# Patient Record
Sex: MALE | Race: BLACK OR AFRICAN AMERICAN | Employment: FULL TIME | URBAN - METROPOLITAN AREA
[De-identification: names, ages, dates, MRNs, and addresses within clinical notes are randomized per-mention and may not be internally consistent; named-entity substitution may affect disease eponyms.]

---

## 2018-11-06 ENCOUNTER — ANESTHESIA EVENT (OUTPATIENT)
Dept: SURGERY | Age: 38
End: 2018-11-06
Payer: COMMERCIAL

## 2018-11-06 NOTE — H&P
[address] 10/30/2018 1:43 PM 
Location: The [address] [place] [address] Patient #: [id number] : 1980 Single / Language: [place] / Race: Black or  Male History of Present Illness Lashay Avila; 10/30/2018 2:02 PM) The patient is a 44year old male who presents for a recheck of Hand Problem. The problem is described as being located in the right hand. The onset of the hand problem has been sudden following a specific incident (punched a wall with license in his hand. ). The symptom has been occuring for 17 days (2018). The symptom occurs intermittently. The course has been improving (can't lift small finger. ). The hand problem is described as mild. The hand problem is aggravated by flexion of the fingers and extention of the fingers. The hand problem is relieved by brace. The symptoms have been associated with painful ROM, decreased ROM and swelling. Previous evaluations done by urgent care center. Previous diagnostic tests have included X-Ray. Patient previous treatment has included  brace. Last office visit was Date: (2018). The treatment performed last visit was other (splint). The patient's dominant hand is their right. Problem List/Past Medical Lashay Avila; 10/30/2018 2:02 PM) No pertinent past medical history   
 
Allergies Lashay Avila; 10/30/2018 2:02 PM) No Known Drug Allergies  [10/16/2018]: 
 
Family History Lashay Avila; 10/30/2018 2:02 PM) Family history unknown   
 
Social History Lashay Avila; 10/30/2018 2:02 PM) Tobacco Use   Never smoker. No Drug Use   
Alcohol Use   Drinks beer. 6 per week Medication History Lashay Avila; 10/30/2018 2:02 PM) No Current Medications  
Medications Reconciled  
 
Past Surgical History Lashay Avila; 10/30/2018 2:02 PM) Umbilical Hernia Repair   
 
Other Problems Lashay Avila; 10/30/2018 2:02 PM) Closed fracture of fifth metacarpal bone (815.00  S62.308A)   
 Closed displaced fracture of base of fourth metacarpal bone of right hand, initial encounter (751.10  T61.683V)   
 
 
 
Review of Systems Geena Alvarado MD; 10/30/2018 4:18 PM) General Not Present- Chills and Fever. Skin Not Present- Bruising, Pallor and Skin Color Changes. Respiratory Not Present- Cough and Difficulty Breathing. Cardiovascular Not Present- Chest Pain and Fainting / Blacking Out. Musculoskeletal Present- Decreased Range of Motion and Joint Pain. Neurological Not Present- Dysesthesia, Paresthesias and Weakness In Extremities. Hematology Not Present- Abnormal Bleeding and Petechiae. Physical Exam (Maribel Alvarado MD; 10/30/2018 4:21 PM) General 
Mental Status - Alert. General Appearance - Cooperative, Well groomed, Not in acute distress. Orientation - Oriented X3. Build & Nutrition - Well nourished. Musculoskeletal 
Upper Extremity Hand/Wrist: Hand - Evaluation of related systems reveals - no digital clubbing or cyanosis and neurovascularly intact bilaterally. Examination of the right hand reveals: Note: range of motion is diminished but present at the fingers of the RIGHT hand. There's no evidence of scissoring or rotational deformity. Inspection and Palpation - Tenderness - moderate, localized and over the dorsal aspect, (R) (especially over the ring finger CMC region and small finger metacarpal neck region). Swelling - fluid, (R). Crepitus - no crepitus bilateral. Sensation is - normal, (R). Examination reveals - ecchymosis present, (R) (especially at the ulnar palm). There is a documented fracture: Location - Right - 4th metacarpal (metacarpal base-with comminution and mild displacement( approximately 20° apex dorsal angulation)) and 5th metacarpal (metacarpal neck fracture with approximately 25-30° apex dorsal angulation and slight impaction). Phalanges: 
Right: Thumb - Functional Testing - Flexor Pollicis Longus is intact. Index Finger - Functional Testing - Flexor Digitorum Superficialis is intact and Flexor Digitorum Profundus is intact. Long Finger - Functional Testing - Flexor Digitorum Superficialis is intact and Flexor Digitorum Profundus is intact. Ring Finger: Inspection and Palpation - Tenderness - 3+ and generalized (along the metacarpal. Nontender at the PIP joint). Swelling - fluid. Ring Finger - Deformities/Malalignments/Discrepancies - Note: there is appearance of ulnar angulation at the PIP. Functional Testing - Flexor Digitorum Superficialis is intact and Flexor Digitorum Profundus is intact, Ligamentous Instability Test negative (no instability or tenderness at the PIP with vigorous testing). Small Finger: Inspection and Palpation - Tenderness - moderate. Swelling - fluid. ROJM: 
MCP: 
Extension: AROM: Note: extensor lag of approximately 20°. PIP: 
Extension: Extension - AROM - Note: extensor lag of approximately 30 degrees. Small Finger - Functional Testing - Flexor Digitorum Superficialis is intact and Flexor Digitorum Profundus is intact. Xrays: three-view x-rays of the RIGHT hand were obtained (CPT 46078), reviewed and compared to prior imaging. Ring finger boxer's fracture is again demonstrated and apex dorsal angulation has increased top approximately 45-50 degrees. There is a slight volar shift of the proximal phalanx access with respect to the metacarpal axis. Ring finger CMC fracture is against demonstrated with mild displacement and mild collapsing of the shaft into the fracture fragments. Assessment & Plan (Danilo Doherty. Artie Reid MD; 10/30/2018 4:27 PM) Closed fracture of fifth metacarpal bone (815.00  S62.308A) Impression: boxer's fracture RIGHT small finger with apex dorsal angulation increasing to approximately 45-50 degrees on imaging. Small finger he now has an extensor lag which is more pronounced.  We reviewed x-ray imaging obtained today and discussed her the treatment options including nonoperative intervention versus surgical correction. We discussed the nature of surgical correction including closed reduction possible open reduction internal fixation. It can patient would like to pursue surgical intervention, because \"my hand Work like this\". Darlene Shane the procedure was discussed including anticipated perioperative course as well as potential location of incisions. We discussed residual extensor lag, stiffness and risks and possible complications including but not limited to bleeding infection, injury to surrounding structures, malunion and nonunion which were described in plain language. I also discussed the influence of this past trauma and fractures to the ability to manipulate and potential residual functional deficits, including extensor lag, of the finger. Patient understands the discussion was proceed. All questions answered. Will arrange for surgery the next of opportunity. Current Plans Pt Education - General Patient Education: discussed with patient and provided information. List of current medications documented by the Provider () Preferred Dates for Surgery:  As soon as possible Results were reviewed with patient The procedure was discussed with the patient and a written consent was obtained and questions were answered.  Risks of the procedure were discussed and include but are not limited to infection, bleeding, nerve, vascular injury as well as the need for future procedures.  It was also discussed the importance of compliance with the treatment directions and participation in the care of the treated extremity. Patient is to avoid activities that cause pain and is instructed to leave the splint and/or cast in place. Closed displaced fracture of base of fourth metacarpal bone of right hand with malunion, subsequent encounter (203.04 G51.764A) Impression: RIGHT ring finger metacarpal base fracture with mild displacement. I explained with the small finger extensor lag and malunion developing, surgical repair of the ring finger would also be beneficial. This is because with stabilization of the ring finger metacarpal, more aggressive therapy can occur. Gladys Weller the procedure was discussed. We will also perform close reduction pinning ring finger metacarpal base fractures in conjunction with repairs of RIGHT small finger fractures. All questions answered Current Plans The procedure was discussed with the patient and a written consent was obtained and questions were answered.  Risks of the procedure were discussed and include but are not limited to infection, bleeding, nerve, vascular injury as well as the need for future procedures.  It was also discussed the importance of compliance with the treatment directions and participation in the care of the treated extremity. Note: _____________________________________ This note was created using voice recognition and transcription software.  Although proofread, it may have human proofreading, computer transcription and phonetic errors. Corrections may be performed at a later date. Please contact my office for any clarification needed. Signed electronically by Savana Miranda MD (10/30/2018 4:27 PM)

## 2018-11-07 ENCOUNTER — HOSPITAL ENCOUNTER (OUTPATIENT)
Age: 38
Setting detail: OUTPATIENT SURGERY
Discharge: HOME | End: 2018-11-07
Attending: PLASTIC SURGERY | Admitting: PLASTIC SURGERY
Payer: COMMERCIAL

## 2018-11-07 ENCOUNTER — ANESTHESIA (OUTPATIENT)
Dept: SURGERY | Age: 38
End: 2018-11-07
Payer: COMMERCIAL

## 2018-11-07 VITALS
RESPIRATION RATE: 13 BRPM | WEIGHT: 177.56 LBS | TEMPERATURE: 97.2 F | HEIGHT: 66 IN | BODY MASS INDEX: 28.54 KG/M2 | SYSTOLIC BLOOD PRESSURE: 132 MMHG | OXYGEN SATURATION: 98 % | DIASTOLIC BLOOD PRESSURE: 86 MMHG | HEART RATE: 59 BPM

## 2018-11-07 DIAGNOSIS — S62.339P CLOSED BOXER'S FRACTURE WITH MALUNION, SUBSEQUENT ENCOUNTER: Primary | ICD-10-CM

## 2018-11-07 DIAGNOSIS — S62.314P: ICD-10-CM

## 2018-11-07 PROCEDURE — 76210000021 HC REC RM PH II 0.5 TO 1 HR: Performed by: PLASTIC SURGERY

## 2018-11-07 PROCEDURE — 74011250636 HC RX REV CODE- 250/636: Performed by: NURSE ANESTHETIST, CERTIFIED REGISTERED

## 2018-11-07 PROCEDURE — 74011250636 HC RX REV CODE- 250/636

## 2018-11-07 PROCEDURE — 76060000035 HC ANESTHESIA 2 TO 2.5 HR: Performed by: PLASTIC SURGERY

## 2018-11-07 PROCEDURE — 77030002986 HC SUT PROL J&J -A: Performed by: PLASTIC SURGERY

## 2018-11-07 PROCEDURE — 74011000250 HC RX REV CODE- 250: Performed by: PLASTIC SURGERY

## 2018-11-07 PROCEDURE — 74011250637 HC RX REV CODE- 250/637: Performed by: NURSE ANESTHETIST, CERTIFIED REGISTERED

## 2018-11-07 PROCEDURE — 76010000131 HC OR TIME 2 TO 2.5 HR: Performed by: PLASTIC SURGERY

## 2018-11-07 PROCEDURE — 77030011267 HC ELECTRD BLD COVD -A: Performed by: PLASTIC SURGERY

## 2018-11-07 PROCEDURE — 77030008833 HC WRE K BRSM -A: Performed by: PLASTIC SURGERY

## 2018-11-07 PROCEDURE — A4565 SLINGS: HCPCS | Performed by: PLASTIC SURGERY

## 2018-11-07 PROCEDURE — 77030033827 HC SLV COMPR SCD THGH COVD -B: Performed by: PLASTIC SURGERY

## 2018-11-07 PROCEDURE — 74011250636 HC RX REV CODE- 250/636: Performed by: PLASTIC SURGERY

## 2018-11-07 PROCEDURE — 76210000063 HC OR PH I REC FIRST 0.5 HR: Performed by: PLASTIC SURGERY

## 2018-11-07 PROCEDURE — 77030018836 HC SOL IRR NACL ICUM -A: Performed by: PLASTIC SURGERY

## 2018-11-07 PROCEDURE — 77030031139 HC SUT VCRL2 J&J -A: Performed by: PLASTIC SURGERY

## 2018-11-07 PROCEDURE — 77030020753 HC CUF TRNQT 1BLA STRY -B: Performed by: PLASTIC SURGERY

## 2018-11-07 PROCEDURE — 77030020335 HC PDNG CST COVD -A: Performed by: PLASTIC SURGERY

## 2018-11-07 PROCEDURE — 64450 NJX AA&/STRD OTHER PN/BRANCH: CPT | Performed by: ANESTHESIOLOGY

## 2018-11-07 PROCEDURE — 76942 ECHO GUIDE FOR BIOPSY: CPT | Performed by: ANESTHESIOLOGY

## 2018-11-07 PROCEDURE — 77030012510 HC MSK AIRWY LMA TELE -B: Performed by: ANESTHESIOLOGY

## 2018-11-07 RX ORDER — MAGNESIUM SULFATE 100 %
4 CRYSTALS MISCELLANEOUS AS NEEDED
Status: DISCONTINUED | OUTPATIENT
Start: 2018-11-07 | End: 2018-11-07 | Stop reason: HOSPADM

## 2018-11-07 RX ORDER — PROPOFOL 10 MG/ML
INJECTION, EMULSION INTRAVENOUS
Status: DISCONTINUED | OUTPATIENT
Start: 2018-11-07 | End: 2018-11-07 | Stop reason: HOSPADM

## 2018-11-07 RX ORDER — MORPHINE SULFATE 4 MG/ML
2-4 INJECTION INTRAVENOUS
Status: DISCONTINUED | OUTPATIENT
Start: 2018-11-07 | End: 2018-11-07 | Stop reason: HOSPADM

## 2018-11-07 RX ORDER — CEFAZOLIN SODIUM 2 G/50ML
2 SOLUTION INTRAVENOUS
Status: COMPLETED | OUTPATIENT
Start: 2018-11-07 | End: 2018-11-07

## 2018-11-07 RX ORDER — FENTANYL CITRATE 50 UG/ML
100 INJECTION, SOLUTION INTRAMUSCULAR; INTRAVENOUS ONCE
Status: DISCONTINUED | OUTPATIENT
Start: 2018-11-07 | End: 2018-11-07 | Stop reason: HOSPADM

## 2018-11-07 RX ORDER — SODIUM CHLORIDE, SODIUM LACTATE, POTASSIUM CHLORIDE, CALCIUM CHLORIDE 600; 310; 30; 20 MG/100ML; MG/100ML; MG/100ML; MG/100ML
25 INJECTION, SOLUTION INTRAVENOUS CONTINUOUS
Status: DISCONTINUED | OUTPATIENT
Start: 2018-11-07 | End: 2018-11-07 | Stop reason: HOSPADM

## 2018-11-07 RX ORDER — OXYCODONE AND ACETAMINOPHEN 5; 325 MG/1; MG/1
1-2 TABLET ORAL
Status: DISCONTINUED | OUTPATIENT
Start: 2018-11-07 | End: 2018-11-07 | Stop reason: HOSPADM

## 2018-11-07 RX ORDER — SODIUM CHLORIDE 0.9 % (FLUSH) 0.9 %
5-10 SYRINGE (ML) INJECTION AS NEEDED
Status: DISCONTINUED | OUTPATIENT
Start: 2018-11-07 | End: 2018-11-07 | Stop reason: HOSPADM

## 2018-11-07 RX ORDER — ONDANSETRON 2 MG/ML
INJECTION INTRAMUSCULAR; INTRAVENOUS AS NEEDED
Status: DISCONTINUED | OUTPATIENT
Start: 2018-11-07 | End: 2018-11-07 | Stop reason: HOSPADM

## 2018-11-07 RX ORDER — MIDAZOLAM HYDROCHLORIDE 1 MG/ML
INJECTION, SOLUTION INTRAMUSCULAR; INTRAVENOUS AS NEEDED
Status: DISCONTINUED | OUTPATIENT
Start: 2018-11-07 | End: 2018-11-07 | Stop reason: HOSPADM

## 2018-11-07 RX ORDER — HYDROCODONE BITARTRATE AND ACETAMINOPHEN 5; 325 MG/1; MG/1
1 TABLET ORAL
Qty: 10 TAB | Refills: 0 | Status: SHIPPED | OUTPATIENT
Start: 2018-11-07

## 2018-11-07 RX ORDER — MIDAZOLAM HYDROCHLORIDE 1 MG/ML
2 INJECTION, SOLUTION INTRAMUSCULAR; INTRAVENOUS ONCE
Status: DISCONTINUED | OUTPATIENT
Start: 2018-11-07 | End: 2018-11-07 | Stop reason: HOSPADM

## 2018-11-07 RX ORDER — MIDAZOLAM HYDROCHLORIDE 1 MG/ML
INJECTION, SOLUTION INTRAMUSCULAR; INTRAVENOUS
Status: COMPLETED
Start: 2018-11-07 | End: 2018-11-07

## 2018-11-07 RX ORDER — LIDOCAINE HYDROCHLORIDE 20 MG/ML
INJECTION, SOLUTION EPIDURAL; INFILTRATION; INTRACAUDAL; PERINEURAL AS NEEDED
Status: DISCONTINUED | OUTPATIENT
Start: 2018-11-07 | End: 2018-11-07 | Stop reason: HOSPADM

## 2018-11-07 RX ORDER — FENTANYL CITRATE 50 UG/ML
50 INJECTION, SOLUTION INTRAMUSCULAR; INTRAVENOUS
Status: DISCONTINUED | OUTPATIENT
Start: 2018-11-07 | End: 2018-11-07 | Stop reason: HOSPADM

## 2018-11-07 RX ORDER — FENTANYL CITRATE 50 UG/ML
INJECTION, SOLUTION INTRAMUSCULAR; INTRAVENOUS
Status: COMPLETED
Start: 2018-11-07 | End: 2018-11-07

## 2018-11-07 RX ORDER — SODIUM CHLORIDE 0.9 % (FLUSH) 0.9 %
5-10 SYRINGE (ML) INJECTION EVERY 8 HOURS
Status: DISCONTINUED | OUTPATIENT
Start: 2018-11-07 | End: 2018-11-07 | Stop reason: HOSPADM

## 2018-11-07 RX ORDER — IBUPROFEN 600 MG/1
TABLET ORAL
Qty: 20 TAB | Refills: 0 | Status: SHIPPED | OUTPATIENT
Start: 2018-11-07

## 2018-11-07 RX ORDER — SODIUM CHLORIDE, SODIUM LACTATE, POTASSIUM CHLORIDE, CALCIUM CHLORIDE 600; 310; 30; 20 MG/100ML; MG/100ML; MG/100ML; MG/100ML
50 INJECTION, SOLUTION INTRAVENOUS CONTINUOUS
Status: DISCONTINUED | OUTPATIENT
Start: 2018-11-07 | End: 2018-11-07 | Stop reason: HOSPADM

## 2018-11-07 RX ORDER — PROPOFOL 10 MG/ML
INJECTION, EMULSION INTRAVENOUS AS NEEDED
Status: DISCONTINUED | OUTPATIENT
Start: 2018-11-07 | End: 2018-11-07 | Stop reason: HOSPADM

## 2018-11-07 RX ORDER — FAMOTIDINE 20 MG/1
20 TABLET, FILM COATED ORAL ONCE
Status: COMPLETED | OUTPATIENT
Start: 2018-11-07 | End: 2018-11-07

## 2018-11-07 RX ORDER — INSULIN LISPRO 100 [IU]/ML
INJECTION, SOLUTION INTRAVENOUS; SUBCUTANEOUS ONCE
Status: DISCONTINUED | OUTPATIENT
Start: 2018-11-07 | End: 2018-11-07 | Stop reason: HOSPADM

## 2018-11-07 RX ORDER — LIDOCAINE HYDROCHLORIDE 10 MG/ML
INJECTION, SOLUTION EPIDURAL; INFILTRATION; INTRACAUDAL; PERINEURAL AS NEEDED
Status: DISCONTINUED | OUTPATIENT
Start: 2018-11-07 | End: 2018-11-07 | Stop reason: HOSPADM

## 2018-11-07 RX ORDER — ROPIVACAINE HYDROCHLORIDE 5 MG/ML
INJECTION, SOLUTION EPIDURAL; INFILTRATION; PERINEURAL
Status: COMPLETED | OUTPATIENT
Start: 2018-11-07 | End: 2018-11-07

## 2018-11-07 RX ORDER — CEFAZOLIN SODIUM 2 G/50ML
2 SOLUTION INTRAVENOUS ONCE
Status: DISCONTINUED | OUTPATIENT
Start: 2018-11-07 | End: 2018-11-07

## 2018-11-07 RX ORDER — ONDANSETRON 2 MG/ML
4 INJECTION INTRAMUSCULAR; INTRAVENOUS
Status: DISCONTINUED | OUTPATIENT
Start: 2018-11-07 | End: 2018-11-07 | Stop reason: HOSPADM

## 2018-11-07 RX ORDER — BUPIVACAINE HYDROCHLORIDE 5 MG/ML
INJECTION, SOLUTION EPIDURAL; INTRACAUDAL AS NEEDED
Status: DISCONTINUED | OUTPATIENT
Start: 2018-11-07 | End: 2018-11-07 | Stop reason: HOSPADM

## 2018-11-07 RX ORDER — DEXTROSE MONOHYDRATE 25 G/50ML
25-50 INJECTION, SOLUTION INTRAVENOUS AS NEEDED
Status: DISCONTINUED | OUTPATIENT
Start: 2018-11-07 | End: 2018-11-07 | Stop reason: HOSPADM

## 2018-11-07 RX ADMIN — CEFAZOLIN SODIUM 2 G: 2 SOLUTION INTRAVENOUS at 11:00

## 2018-11-07 RX ADMIN — ONDANSETRON 4 MG: 2 INJECTION INTRAMUSCULAR; INTRAVENOUS at 11:38

## 2018-11-07 RX ADMIN — PROPOFOL 30 MG: 10 INJECTION, EMULSION INTRAVENOUS at 11:12

## 2018-11-07 RX ADMIN — MIDAZOLAM HYDROCHLORIDE 2 MG: 1 INJECTION, SOLUTION INTRAMUSCULAR; INTRAVENOUS at 10:51

## 2018-11-07 RX ADMIN — MIDAZOLAM 2 MG: 1 INJECTION INTRAMUSCULAR; INTRAVENOUS at 09:37

## 2018-11-07 RX ADMIN — SODIUM CHLORIDE, SODIUM LACTATE, POTASSIUM CHLORIDE, AND CALCIUM CHLORIDE 50 ML/HR: 600; 310; 30; 20 INJECTION, SOLUTION INTRAVENOUS at 08:49

## 2018-11-07 RX ADMIN — FAMOTIDINE 20 MG: 20 TABLET ORAL at 08:49

## 2018-11-07 RX ADMIN — PROPOFOL 20 MG: 10 INJECTION, EMULSION INTRAVENOUS at 11:01

## 2018-11-07 RX ADMIN — FENTANYL CITRATE 100 MCG: 50 INJECTION, SOLUTION INTRAMUSCULAR; INTRAVENOUS at 09:37

## 2018-11-07 RX ADMIN — PROPOFOL 20 MG: 10 INJECTION, EMULSION INTRAVENOUS at 11:00

## 2018-11-07 RX ADMIN — PROPOFOL 75 MCG/KG/MIN: 10 INJECTION, EMULSION INTRAVENOUS at 11:05

## 2018-11-07 RX ADMIN — ROPIVACAINE HYDROCHLORIDE 25 ML: 5 INJECTION, SOLUTION EPIDURAL; INFILTRATION; PERINEURAL at 09:45

## 2018-11-07 RX ADMIN — PROPOFOL 60 MG: 10 INJECTION, EMULSION INTRAVENOUS at 10:59

## 2018-11-07 RX ADMIN — PROPOFOL 170 MG: 10 INJECTION, EMULSION INTRAVENOUS at 11:21

## 2018-11-07 RX ADMIN — LIDOCAINE HYDROCHLORIDE 50 MG: 20 INJECTION, SOLUTION EPIDURAL; INFILTRATION; INTRACAUDAL; PERINEURAL at 10:58

## 2018-11-07 NOTE — DISCHARGE INSTRUCTIONS
Open Reduction With Internal Fixation of a Limb: What to Expect at [address] can expect some pain and swelling around the cut (incision) the doctor made. This should get better within a few days after your surgery. But it is normal to have some pain for 2 to 3 weeks after surgery and mild pain for up to 6 weeks after surgery. How soon you can return to work and your normal routine depends on your job and how long it takes the bone to heal. For example, if you have a fractured leg and you sit at work, you may be able to go back in 1 to 2 weeks. But if your job requires you to walk or stand a lot, you will need to wait until your fracture has healed before you go back to work. This care sheet gives you a general idea about how long it will take for you to recover. But each person recovers at a different pace. Follow the steps below to get better as quickly as possible. How can you care for yourself at home? Activity    · Rest when you feel tired. Getting enough sleep will help you recover.     · Increase your activity as recommended by your doctor. Being active boosts blood flow and helps prevent pneumonia and constipation. It is usually okay to exercise other parts of your body as soon as you feel well enough.     · Avoid putting weight on your repaired bone until your doctor says it is okay.     · You will probably need to take 1 to 2 weeks off from work. It depends on the type of work you do and how you feel.     · Do not shower for 1 or 2 days after surgery. When you shower, keep your dressing and incisions dry. If you have a cast, tape a sheet of plastic to cover it so that it does not get wet. It may help to sit on a shower stool.     · Do not take a bath, swim, use a hot tub, or soak your affected limb until your incision is healed. This usually takes 1 to 2 weeks. Diet    · You can eat your normal diet.  If your stomach is upset, try bland, low-fat foods like plain rice, broiled chicken, toast, and yogurt. Medicines    · Your doctor will tell you if and when you can restart your medicines. He or she will also give you instructions about taking any new medicines.     · If you take blood thinners, such as warfarin (Coumadin), clopidogrel (Plavix), or aspirin, be sure to talk to your doctor. He or she will tell you if and when to start taking those medicines again. Make sure that you understand exactly what your doctor wants you to do.     · Take pain medicines exactly as directed. ? If the doctor gave you a prescription medicine for pain, take it as prescribed. ? If you are not taking a prescription pain medicine, ask your doctor if you can take an over-the-counter medicine.     · If you think your pain medicine is making you sick to your stomach:  ? Take your medicine after meals (unless your doctor has told you not to). ? Ask your doctor for a different pain medicine.     · If your doctor prescribed antibiotics, take them as directed. Do not stop taking them just because you feel better. You need to take the full course of antibiotics. Incision care    · If you have strips of tape on the incision, leave the tape on for a week or until it falls off.     · If you do not have a cast, clean the incision 2 times a day after your doctor allows you to remove the bandage. Use only soap and water to clean the incision unless your doctor gives you different instructions. Don't use hydrogen peroxide or alcohol, which can slow healing. Exercise    · Do exercises as instructed by your doctor or physical therapist. These exercises will help keep your muscles strong and your joints flexible while your bone is healing.     · Wiggle your fingers or toes on the injured arm or leg often. This helps reduce swelling and stiffness. Ice and elevation    · Prop up the injured arm or leg on a pillow when you ice it or anytime you sit or lie down during the first 1 to 2 weeks after your surgery.  Try to keep it above the level of your heart. This will help reduce swelling and pain. Other instructions    · If you have a cast or splint:  ? Keep it dry. ? If you have a removable splint, ask your doctor if it is okay to take it off to bathe. Your doctor may want you to keep it on as much as possible. Be careful not to put the splint on too tight. ? Do not stick objects such as pencils or coat hangers in your cast or splint to scratch your skin. ? Do not put powder into your cast or splint to relieve itchy skin. ? Never cut or alter your cast or splint. Follow-up care is a key part of your treatment and safety. Be sure to make and go to all appointments, and call your doctor if you are having problems. It's also a good idea to know your test results and keep a list of the medicines you take. When should you call for help? Call 911 anytime you think you may need emergency care. For example, call if:    · You passed out (lost consciousness).     · You have severe trouble breathing.     · You have sudden chest pain and shortness of breath, or you cough up blood.    Call your doctor now or seek immediate medical care if:    · You have pain that does not get better after you take pain medicine.     · Your fingers or toes on the injured arm or leg are cool, pale, or change color.     · You have tingling or numbness in your fingers or toes.     · You cannot move your fingers or toes.     · Your cast or splint feels too tight.     · The skin under your cast or splint is burning or stinging.     · You have signs of infection, such as:  ? Increased pain, swelling, warmth, or redness. ? Red streaks leading from the incision. ? Pus draining from the incision. ? A fever.     · You have drainage or a bad smell coming from the cast or splint.     · You have signs of a blood clot, such as:  ? Pain in your calf, back of the knee, thigh, or groin. ?  Redness and swelling in your leg or groin.    Watch closely for any changes in your health, and be sure to contact your doctor if:    · You have any problems with your cast or splint. Where can you learn more? Go to http://selina-gomez.info/. Enter C384 in the search box to learn more about \"Open Reduction With Internal Fixation of a Limb: What to Expect at Home. \"  Current as of: November 29, 2017  Content Version: 11.8  © 6968-3529 [employer]. Care instructions adapted under license by [employer] (which disclaims liability or warranty for this information). If you have questions about a medical condition or this instruction, always ask your healthcare professional. [place] [address] any warranty or liability for your use of this information. DISCHARGE SUMMARY from Nurse    PATIENT INSTRUCTIONS:    After general anesthesia or intravenous sedation, for 24 hours or while taking prescription Narcotics:  · Limit your activities  · Do not drive and operate hazardous machinery  · Do not make important personal or business decisions  · Do  not drink alcoholic beverages  · If you have not urinated within 8 hours after discharge, please contact your surgeon on call. Report the following to your surgeon:  · Excessive pain, swelling, redness or odor of or around the surgical area  · Temperature over 100.5  · Nausea and vomiting lasting longer than 4 hours or if unable to take medications  · Any signs of decreased circulation or nerve impairment to extremity: change in color, persistent  numbness, tingling, coldness or increase pain  · Any questions    What to do at Home:  . These are general instructions for a healthy lifestyle:    No smoking/ No tobacco products/ Avoid exposure to second hand smoke  Surgeon General's Warning:  Quitting smoking now greatly reduces serious risk to your health.     Obesity, smoking, and sedentary lifestyle greatly increases your risk for illness    A healthy diet, regular physical exercise & weight monitoring are important for maintaining a healthy lifestyle    You may be retaining fluid if you have a history of heart failure or if you experience any of the following symptoms:  Weight gain of 3 pounds or more overnight or 5 pounds in a week, increased swelling in our hands or feet or shortness of breath while lying flat in bed. Please call your doctor as soon as you notice any of these symptoms; do not wait until your next office visit. Recognize signs and symptoms of STROKE:    F-face looks uneven    A-arms unable to move or move unevenly    S-speech slurred or non-existent    T-time-call 911 as soon as signs and symptoms begin-DO NOT go       Back to bed or wait to see if you get better-TIME IS BRAIN. Warning Signs of HEART ATTACK     Call 911 if you have these symptoms:   Chest discomfort. Most heart attacks involve discomfort in the center of the chest that lasts more than a few minutes, or that goes away and comes back. It can feel like uncomfortable pressure, squeezing, fullness, or pain.  Discomfort in other areas of the upper body. Symptoms can include pain or discomfort in one or both arms, the back, neck, jaw, or stomach.  Shortness of breath with or without chest discomfort.  Other signs may include breaking out in a cold sweat, nausea, or lightheadedness. Don't wait more than five minutes to call 911 - MINUTES MATTER! Fast action can save your life. Calling 911 is almost always the fastest way to get lifesaving treatment. Emergency Medical Services staff can begin treatment when they arrive -- up to an hour sooner than if someone gets to the hospital by car. The discharge information has been reviewed with the patient. The patient verbalized understanding. Discharge medications reviewed with the patient and appropriate educational materials and side effects teaching were provided. Patient armband removed and given to patient to take home.   Patient was informed of the privacy risks if armband lost or stolen  _________________________________________________________________________________________________________________________________

## 2018-11-07 NOTE — ANESTHESIA PROCEDURE NOTES
Peripheral Block Start time: 11/7/2018 9:37 AM 
End time: 11/7/2018 9:46 AM 
Performed by: Ginger Cordova MD 
Authorized by: Ginger Cordova MD  
 
 
Pre-procedure: Indications: at surgeon's request, post-op pain management and procedure for pain Preanesthetic Checklist: patient identified, risks and benefits discussed, site marked, timeout performed, anesthesia consent given and patient being monitored Timeout Time: 09:36 Block Type:  
Block Type:  Brachial plexus Laterality:  Right Monitoring:  Standard ASA monitoring, continuous pulse ox, frequent vital sign checks, oxygen, responsive to questions and heart rate Injection Technique:  Single shot Procedures: ultrasound guided and nerve stimulator Patient Position: seated Prep: chlorhexidine Location:  Supraclavicular Needle Type:  Stimuplex Needle Gauge:  22 G Needle Localization:  Ultrasound guidance and nerve stimulator Motor Response: minimal motor response >0.4 mA Assessment: 
 
Injection Assessment:  No intravascular symptoms, negative aspiration for blood, local visualized surrounding nerve on ultrasound, ultrasound image on chart, no paresthesia and incremental injection every 5 mL Patient tolerance:  Patient tolerated the procedure well with no immediate complications Location:  PREOP HOLDING Patient given 2 mg IV Versed and 100 mcg IV Fentanyl for sedation.  
 
11/7/2018     9:46 AM     Nima Collins MD

## 2018-11-07 NOTE — BRIEF OP NOTE
BRIEF OPERATIVE NOTE 
 
BRIEF OPERATIVE NOTE Patient: Ana Madden MRN: [id number]  CSN: [id number] YOB: 1980  Age: 44 y.o. Sex: male Date of Procedure: 11/7/2018 Preoperative Diagnosis: right small and ring fingers mcp joint fractures Postoperative Diagnosis: right small and ring fingers mcp joint fractures Procedure: Procedure(s): OPEN REDUCTION INTERNAL FIXATION of mcp joint neck right small finger, closed pinning right ring finger MCP joint base Surgeon: Teena Wang MD  
  
First Assistant:   NAVEEN Anesthesia Staff: Anesthesiologist: Lois Harris MD 
CRNA: Ronni Ross CRNA Anesthesia: General  Due for incomplete regional block. Local Used:  10 c  1 % lidocaine and 0.5 % marcaine  50:50 mix ulnar nerve block to supplement supraclavicular block Fluid:  550 cc crystalloid Tourniquet Time: Total Tourniquet Time Documented: 
Upper Arm (Right) - 67 minutes Total: Upper Arm (Right) - 67 minutes 
 @  200 mm Hg. Estimated Blood Loss: <5 cc Specimens: None Implants:  
Implant Name Type Inv. Item Serial No.  Lot No. LRB No. Used  
k wire . 045     X Right 3 Findings: Right SF Metacarpal fracture with comminution and displacement- not amenable to closed reduction. Ring finger metacarpal base fracture with displacement Complications: None; patient tolerated the procedure well. Teena Wang MD 
11/7/2018 
1:07 PM 
 
Dictation Number: #[id number]

## 2018-11-07 NOTE — OP NOTES
[facility]  OPERATIVE REPORT    Sangita Paredes  MR#: [id number]  : 1980  ACCOUNT #: [de-identified]   DATE OF SERVICE: 2018    PREOPERATIVE DIAGNOSIS:  Right small finger and ring finger metacarpal fractures with displacement. POSTOPERATIVE DIAGNOSIS:  Right small finger and ring finger metacarpal fractures with displacement. PROCEDURES PERFORMED:  1. Open reduction internal fixation, right small finger metacarpal neck fracture. 2.  Closed reduction and skeletal fixation, right ring finger metacarpal base fracture. SURGEON:  Trudy Gamboa MD    ASSISTANT:   student. ANESTHESIA:  Regional block and ulnar nerve block and general anesthesia via LMA. Local anesthetic used 10 mL of 1% lidocaine: 0.5% Marcaine plain. FLUIDS ADMINISTERED:  550 mL of crystalloid. TOURNIQUET TIME:  67 minutes at 200 mmHg. ESTIMATED BLOOD LOSS:  Less than 5 mL. SPECIMENS REMOVED:  None. IMPLANTS:  0.045 inch K-wire x3. FINDINGS:  Right small finger metacarpal neck fracture with comminution and displacement not amenable to closed reduction. Ring finger metacarpal base fracture with displacement. COMPLICATIONS:  None. INDICATIONS:  The patient is a 44-year-old male who sustained fractures of the right ring and small fingers due to trauma. He was undergoing nonoperative management; however, fractures, particularly at the small finger, have developed further displacement and angulation at the MP joint, which is limiting extension of the small finger. He therefore is being brought to the operating room for closed reduction versus open reduction internal fixation of fractures. DESCRIPTION OF PROCEDURE:  After proper informed consent was obtained, he was brought to the operating room and placed in the supine position once a regional block was performed.   Once general sedation was achieved, ulnar nerve block was utilized to supplement the supraclavicular block since ulnar nerve sensory changes were not realized after 30 minutes time elapse after regional block. The right upper extremity was then prepped and draped for a sterile field using DuraPrep solution. Because of residual pain behavior with manipulation of the fractures, the patient was placed under general LMA anesthesia. The arm was then elevated for gravity exsanguination while the brachial artery was compressed digitally and tourniquet was inflated to 200 mmHg. Fracture manipulation was accomplished as possible at the small finger. Metacarpal neck fracture was found to be not amenable to reduction. Therefore, a longitudinal incision was made over the small finger metacarpal and dissection was carried through the skin into the subcutaneous tissue. EDM/EDC to the small finger interval was developed using a 15 blade scalpel. The extensor burnette was partially divided at the same interval.  Using a new 15 blade scalpel, periosteum was divided at the dorsal metacarpal aspect of the small finger. Subperiosteal plane was developed with a freer-elevator, thus demonstrating the fracture and metacarpal head region. Care was taken to preserve the collateral ligaments. Using a [place] elevator, fracture was mobilized, and using a synovial rongeur, soft callus was removed as possible, thus mobilizing the fracture further. A 0.045 inch K-wire was then introduced through the ulnar metacarpal head of the small finger. Fracture was reduced as possible, thus realigning the metacarpal head with the shaft. Pin was then advanced in a retrograde fashion to the base of the metacarpal.  A second 0.045 inch K-wire was then introduced to the radial head of the metacarpal, advanced across the fracture site and into the metacarpal base of the small finger. Attention was then directed to the ring finger.     On lateral view images, there was found to be a shard of metacarpal at the proximal dorsal aspect of the ring finger metacarpal. Therefore, a 0.045 inch K-wire was introduced into the ring finger metacarpal head, advanced to the fracture fragment and reduction was accomplished to reduce the prominent bone spicule of the ring finger metacarpal.  The pin was then advanced to the base of the metacarpal level. Further reduction of the metacarpal base fractures, which appear highly comminuted on fluoroscopy, was not pursued because it was felt further reduction would require open reduction internal fixation, and further reduction with the comminution would unlikely be of further benefit to the outcome. All pins were bent and trimmed to the appropriate length. Wounds were copiously irrigated and the pins were bent and trimmed to appropriate length. Closure of the periosteum was accomplished using 4-0 Vicryl in a figure-of-eight fashion. Extensor burnette repair and tacking of the EDM to the [facility] tendon were accomplished using 4-0 Vicryl in a figure-of-eight fashion. Skin was closed using 4-0 Prolene in a horizontal mattress fashion. Sterile dressings were applied consisting of Xeroform, 4 x 4's and Kerlix. Forearm based splint was fashioned with the fingers in intrinsic plus position as possible and wrist in slight extension. All this held in place with Ace wrap. The patient tolerated the procedure well. All counts were correct at conclusion of procedure. Dr. Lisa Abrams performed the entire procedure. He was awakened, extubated, and taken to recovery room in stable condition. There were no complications. The patient tolerated the procedure well.       MD NUBIA Aldana / [place]  D: 11/07/2018 13:27     T: 11/07/2018 18:07  JOB #: [id number]

## 2018-11-07 NOTE — ANESTHESIA PREPROCEDURE EVALUATION
Anesthetic History No history of anesthetic complications Review of Systems / Medical History Patient summary reviewed and pertinent labs reviewed Pulmonary Within defined limits Neuro/Psych Within defined limits Cardiovascular Within defined limits GI/Hepatic/Renal 
Within defined limits Endo/Other Within defined limits Other Findings Physical Exam 
 
Airway Mallampati: II 
TM Distance: 4 - 6 cm Neck ROM: normal range of motion Mouth opening: Normal 
 
 Cardiovascular Dental 
No notable dental hx Pulmonary Abdominal 
GI exam deferred Other Findings Anesthetic Plan ASA: 1 Anesthesia type: MAC and general - backup Post-op pain plan if not by surgeon: peripheral nerve block single Anesthetic plan and risks discussed with: Patient

## 2018-11-07 NOTE — INTERVAL H&P NOTE
H&P Update: 
Hattie Nyhan was seen and examined. History and physical has been reviewed. The patient has been examined. There have been no significant clinical changes since the completion of the originally dated History and Physical. Discussed CRPP vs ORIF right Small and Ring finger Metacarpal fractures. All questions answered.  
 
Signed By: Francis Ortiz MD   
 November 7, 2018 10:43 AM

## 2018-11-07 NOTE — PERIOP NOTES
1302 Pt received to PACU and connected to monitor. Bedside report given by Moshe Ng RN. Vital signs stable. Nurse at bedside. Will continue to monitor.

## 2018-11-07 NOTE — ANESTHESIA POSTPROCEDURE EVALUATION
Procedure(s): OPEN REDUCTION INTERNAL FIXATION of mcp joint neck right small finger, closed pinning right ring finger MCP joint base. Anesthesia Post Evaluation Multimodal analgesia: multimodal analgesia used between 6 hours prior to anesthesia start to PACU discharge Patient location during evaluation: bedside Patient participation: complete - patient participated Level of consciousness: awake Pain management: adequate Airway patency: patent Anesthetic complications: no 
Cardiovascular status: acceptable Respiratory status: acceptable Hydration status: acceptable Visit Vitals /86 (BP Patient Position: At rest) Pulse (!) 59 Temp 36.2 °C (97.2 °F) Resp 13 Ht 5' 6\" (1.676 m) Wt 80.5 kg (177 lb 9 oz) SpO2 98% BMI 28.66 kg/m²

## (undated) DEVICE — SLEEVE COMPR THGH LEN MED TEAR AWAY GARM SCD EXPRESS [DVT30] [MEDTRONIC COVIDIEN KENDALL]

## (undated) DEVICE — SUTURE VCRL SZ 4-0 L27IN ABSRB UD L17MM RB-1 1/2 CIR J214H

## (undated) DEVICE — WEBRIL COTTON UNDERCAST PADDING: Brand: WEBRIL

## (undated) DEVICE — SUTURE NONABSORBABLE MONOFILAMENT 4-0 PS-2 18 IN BLU PROLENE 8682H

## (undated) DEVICE — DRAPE,HAND,STERILE: Brand: MEDLINE

## (undated) DEVICE — KERLIX BANDAGE ROLL: Brand: KERLIX

## (undated) DEVICE — 1010 S-DRAPE TOWEL DRAPE 10/BX: Brand: STERI-DRAPE™

## (undated) DEVICE — Device

## (undated) DEVICE — DISPOSABLE TOURNIQUET CUFF SINGLE BLADDER, SINGLE PORT AND QUICK CONNECT CONNECTOR: Brand: COLOR CUFF

## (undated) DEVICE — INTENDED FOR TISSUE SEPARATION, AND OTHER PROCEDURES THAT REQUIRE A SHARP SURGICAL BLADE TO PUNCTURE OR CUT.: Brand: BARD-PARKER ® CARBON RIB-BACK BLADES

## (undated) DEVICE — ELASTIC BANDAGE: Brand: DEROYAL

## (undated) DEVICE — KIT PROC EXTRM HND FT CUST LF --

## (undated) DEVICE — ROCKER SWITCH PENCIL HOLSTER: Brand: VALLEYLAB

## (undated) DEVICE — INSULATED BLADE ELECTRODE: Brand: EDGE

## (undated) DEVICE — SPLINT CAST PLASTER 4X15FT -- DYNACAST

## (undated) DEVICE — GAUZE SPONGES,12 PLY: Brand: CURITY

## (undated) DEVICE — TOWEL SURG W16XL26IN BLU NONFENESTRATED DLX ST 2 PER PK

## (undated) DEVICE — LIGHT HANDLE: Brand: DEVON

## (undated) DEVICE — PREP SKN CHLRAPRP 26ML TNT -- CONVERT TO ITEM 373320

## (undated) DEVICE — BANDAGE COMPR W4INXL5YD ELAS CLP CLSR

## (undated) DEVICE — GOWN,SIRUS,FABRNF,XL,20/CS: Brand: MEDLINE

## (undated) DEVICE — OCCLUSIVE GAUZE STRIP,3% BISMUTH TRIBROMOPHENATE IN PETROLATUM BLEND: Brand: XEROFORM

## (undated) DEVICE — GAMMEX® NON-LATEX SIZE 8, STERILE NEOPRENE POWDER-FREE SURGICAL GLOVE: Brand: GAMMEX

## (undated) DEVICE — SOLUTION IV 1000ML 0.9% SOD CHL

## (undated) DEVICE — SLING ARM 2-37INX8-17IN PCH -- MED